# Patient Record
Sex: FEMALE | Race: WHITE | NOT HISPANIC OR LATINO | Employment: FULL TIME | URBAN - METROPOLITAN AREA
[De-identification: names, ages, dates, MRNs, and addresses within clinical notes are randomized per-mention and may not be internally consistent; named-entity substitution may affect disease eponyms.]

---

## 2021-05-19 ENCOUNTER — HISTORICAL (OUTPATIENT)
Dept: ADMINISTRATIVE | Facility: HOSPITAL | Age: 26
End: 2021-05-19

## 2021-05-19 LAB — SARS-COV-2 AG RESP QL IA.RAPID: NEGATIVE

## 2021-05-20 ENCOUNTER — HISTORICAL (OUTPATIENT)
Dept: SURGERY | Facility: HOSPITAL | Age: 26
End: 2021-05-20

## 2021-06-07 ENCOUNTER — HISTORICAL (OUTPATIENT)
Dept: ADMINISTRATIVE | Facility: HOSPITAL | Age: 26
End: 2021-06-07

## 2022-04-10 ENCOUNTER — HISTORICAL (OUTPATIENT)
Dept: ADMINISTRATIVE | Facility: HOSPITAL | Age: 27
End: 2022-04-10
Payer: MEDICAID

## 2022-04-29 VITALS
BODY MASS INDEX: 30.68 KG/M2 | SYSTOLIC BLOOD PRESSURE: 125 MMHG | DIASTOLIC BLOOD PRESSURE: 85 MMHG | HEIGHT: 61 IN | WEIGHT: 162.5 LBS

## 2022-04-30 NOTE — H&P
Patient:   Robyn Fuentes             MRN: [id number]            FIN: [id number]               Age:   25 years     Sex:  Female     :  1995   Associated Diagnoses:   None   Author:   Dequan Covington MD      Patient presents today for surgery. There are no interval changes from formal history and physiocal examination performed in clinic.    Patient will undergo surgery as detailed in clinic note.    Dr. Jarred Cannon

## 2022-05-04 NOTE — HISTORICAL [facility] CERNER
This is a historical note converted from [facility]. Formatting and pictures may have been removed.  Please reference [facility] for original formatting and attached multimedia. Chief Complaint  Referral for R 5th finger dislocation  History of Present Illness  Ms Fuentes is a 25-year-old left-hand-dominant female comes to clinic today for her right fifth MCP dislocation.? She reports that this happened more than 4 weeks ago.? She has had 2 failed attempts at closed reduction.? Unfortunately?she has been attempting to get herself admitted?to a drug rehabilitation facility?which resulted in a delay?in care for her hand.  ?  Patient reports he smokes proximally half pack of cigarettes per day. ?She is currently not working.  Review of Systems  Constitutional:?no fever, fatigue, weakness  Eye:?no vision loss, eye redness, drainage, or pain  ENMT:?no sore throat, ear pain, sinus pain/congestion, nasal congestion/drainage  Respiratory:?no cough, no wheezing, no shortness of breath  Cardiovascular:?no chest pain, no palpitations, no edema  Gastrointestinal:?no nausea, vomiting, or diarrhea. No abdominal pain  ?  ?  Physical Exam  Vitals & Measurements  HT:?149.00?cm? WT:?72.600?kg? BMI:?32.7?  Right hand:?The patient reports some subjective numbness [1/2]?on the right fifth finger?she is otherwise neurovascular intact distally in the right hand. ?She does report occasional numbness throughout the hand?and no particular peripheral nerve distribution?however this cannot be reproduced in clinic today.  ?  X-ray right hand?performed today reviewed with patient: Persistent dislocation of the right?fifth?MCP joint.  Assessment/Plan  1.?Dislocation of MCP joint, closed?S63.023A  ?I had a long discussion with the patient regarding her injury. ?She does seem to have a persistent dislocation in spite of attempts at closed reduction?of her right fifth MCP joint.? Recommended that she undergo attempt at closed reduction?in the operating room.  ?If we are not able to reduce this joint or does not stable?we will perform an open reduction and/or percutaneous pinning?of the right fifth MCP joint.  ?  The risks, benefits, outcomes, and alternatives of conservative vs operative management were discussed with the patient in clinic today.? Informed consent was obtained for?the aforementioned procedure.  Orders:  XR Hand Right Minimum 3 Views, Routine, 05/19/21 11:07:00 CDT, None, Ambulatory, Rad Type, Closed dislocation, Not Scheduled, 05/19/21 11:07:00 CDT   Problem List/Past Medical History  Ongoing  Morbid obesity  Historical  No qualifying data  Procedure/Surgical History  Application of finger splint; static (04/24/2021)  Closed treatment of metacarpophalangeal dislocation, single, with manipulation; without anesthesia (04/24/2021)  Immobilization of Right Finger using Splint (04/24/2021)  Reposition Right Metacarpophalangeal Joint, External Approach (04/24/2021)  D&C (2011)   Medications  acetaminophen-oxycodone 325 mg-7.5 mg oral tablet, 1 tab(s), Oral, QID,? ?Not taking  azithromycin 500 mg oral tablet, 1000 mg= 2 tab(s), Oral, Once,? ?Not taking  nitrofurantoin macrocrystals-monohydrate 100 mg oral capsule, 100 mg= 1 cap(s), Oral, BID,? ?Not taking  Allergies  No Known Medication Allergies  Social History  Abuse/Neglect  No, No, Yes, 04/24/2021  No, 04/21/2021  No, 08/25/2019  Alcohol  Current, 1-2 times per month, 05/19/2021  Substance Use  Past, 05/19/2021  Tobacco  4 or less cigarettes(less than 1/4 pack)/day in last 30 days, Cigarettes, No, 4 per day. 23 Years (Age started)., 05/19/2021  Health Maintenance  Health Maintenance  ???Pending?(in the next year)  ??? ??OverDue  ??? ? ? ?Cervical Cancer Screening due??12/02/18??and every 3??year(s)  ??? ? ? ?Depression Screening due??06/28/19??and every 1??year(s)  ??? ? ? ?Influenza Vaccine due??10/01/20??and every 1??day(s)  ??? ? ? ?Smoking Cessation due??01/01/21??Variable frequency  ??? ? ? ?Alcohol  Misuse Screening due??01/02/21??and every 1??year(s)  ??? ??Due?  ??? ? ? ?ADL Screening due??05/19/21??and every 1??year(s)  ??? ? ? ?Blood Pressure Screening due??05/19/21??Unknown Frequency  ??? ? ? ?Tetanus Vaccine due??05/19/21??and every 10??year(s)  ??? ??Due In Future?  ??? ? ? ?Obesity Screening not due until??01/01/22??and every 1??year(s)  ???Satisfied?(in the past 1 year)  ??? ??Satisfied?  ??? ? ? ?Blood Pressure Screening on??04/24/21.??Satisfied by Mathew Patel RN.  ??? ? ? ?Body Mass Index Check on??05/19/21.??Satisfied by Milligan RN, Desi R  ??? ? ? ?Diabetes Screening on??04/24/21.??Satisfied by Joanna Menjivar  ??? ? ? ?Obesity Screening on??05/19/21.??Satisfied by Milligan RN, Desi R  ?

## 2022-05-04 NOTE — HISTORICAL [facility] CERNER
This is a historical note converted from [facility]. Formatting and pictures may have been removed.  Please reference [place] for original formatting and attached multimedia. Chief Complaint  Post Op ORIF Rt hand 5th digit.  History of Present Illness  This is a 25-year-old female status post?ORPP of a?right fifth?MCP joint chronic dislocation?on 5/20/2021. ?She missed her first follow-up appointment. ?She is now 3 weeks out,?her MCP joint is still reduced, both pins are still in place.? She has not had any infectious symptoms at her pin sites.  Review of Systems  Constitutional:?no fever, fatigue, weakness  Eye:?no vision loss, eye redness, drainage, or pain  ENMT:?no sore throat, ear pain, sinus pain/congestion, nasal congestion/drainage  Respiratory:?no cough, no wheezing, no shortness of breath  Cardiovascular:?no chest pain, no palpitations, no edema  Gastrointestinal:?no nausea, vomiting, or diarrhea. No abdominal pain  ?  ?  Physical Exam  Vitals & Measurements  HR:?86(Peripheral)? BP:?125/85?  HT:?154.00?cm? WT:?73.700?kg? BMI:?31.08?  Right upper extremity  2 pins in the right small finger,?no erythema, drainage, fluctuance at pin sites  Sutures in place,?dorsal incision well healing, sutures removed today  Sensation tact light touch over radial and ulnar aspect of small finger  Finger warm and well-perfused brisk cap refill  ?  Independent review of radiographs shows?appropriate reduction of the fifth MCP joint.  Assessment/Plan  1.?Dislocation of MCP joint of hand?S63.269A  ?25-year-old female status post ORP P of right?fifth?MCP joint on?5/20/2021.  ?  She is doing well postoperatively,?we reiterated the importance of keeping the pins in,?maintaining nonweightbearing to that hand, working on range of motion exercises.? Staples?stitches removed today.? We will keep the pins in for a total of 6 weeks after surgery. ?We will see her back in 3 weeks with repeat x-rays, removal of pins,?and physical therapy to get  her range of motion back  ?  Dr. Jarred Cannon  ?  Robyn Fuentes?was evaluated at the time of the encounter with?Dr Cannon.? HPI, PE and treatment plan was reviewed.? Treatment plan was reasonable and necessary.??Imaging was reviewed at the time of visit.??  ?  Orders:  XR Hand Fifth Digit Right Min 2 Views, Routine, 06/07/21 11:32:00 CDT, Fracture, fracture, None, Ambulatory, Rad Type, Hand fracture, right, Not Scheduled, 06/07/21 11:32:00 CDT   Medications  Percocet 5/325 oral tablet, 1 tab(s), Oral, q4hr, PRN

## 2023-07-05 ENCOUNTER — HOSPITAL ENCOUNTER (EMERGENCY)
Facility: HOSPITAL | Age: 28
Discharge: HOME | End: 2023-07-05
Attending: STUDENT IN AN ORGANIZED HEALTH CARE EDUCATION/TRAINING PROGRAM
Payer: MEDICAID

## 2023-07-05 VITALS
TEMPERATURE: 98 F | OXYGEN SATURATION: 97 % | SYSTOLIC BLOOD PRESSURE: 109 MMHG | RESPIRATION RATE: 21 BRPM | DIASTOLIC BLOOD PRESSURE: 80 MMHG | HEART RATE: 86 BPM

## 2023-07-05 DIAGNOSIS — R55 SYNCOPE: ICD-10-CM

## 2023-07-05 DIAGNOSIS — R55 VASOVAGAL SYNCOPE: Primary | ICD-10-CM

## 2023-07-05 LAB
ALBUMIN SERPL-MCNC: 3.2 G/DL (ref 3.5–5)
ALBUMIN/GLOB SERPL: 1.2 RATIO (ref 1.1–2)
ALP SERPL-CCNC: 42 UNIT/L (ref 40–150)
ALT SERPL-CCNC: 11 UNIT/L (ref 0–55)
AMPHET UR QL SCN: NEGATIVE
APPEARANCE UR: CLEAR
AST SERPL-CCNC: 11 UNIT/L (ref 5–34)
B-HCG SERPL QL: NEGATIVE
BACTERIA #/AREA URNS AUTO: NORMAL /HPF
BARBITURATE SCN PRESENT UR: NEGATIVE
BASOPHILS # BLD AUTO: 0.01 X10(3)/MCL
BASOPHILS NFR BLD AUTO: 0.1 %
BENZODIAZ UR QL SCN: NEGATIVE
BILIRUB UR QL STRIP.AUTO: NEGATIVE MG/DL
BILIRUBIN DIRECT+TOT PNL SERPL-MCNC: 0.2 MG/DL
BUN SERPL-MCNC: 9.2 MG/DL (ref 7–18.7)
CALCIUM SERPL-MCNC: 8.9 MG/DL (ref 8.4–10.2)
CANNABINOIDS UR QL SCN: POSITIVE
CHLORIDE SERPL-SCNC: 103 MMOL/L (ref 98–107)
CK SERPL-CCNC: 49 U/L (ref 29–168)
CO2 SERPL-SCNC: 27 MMOL/L (ref 22–29)
COCAINE UR QL SCN: NEGATIVE
COLOR UR: YELLOW
CREAT SERPL-MCNC: 0.76 MG/DL (ref 0.55–1.02)
EOSINOPHIL # BLD AUTO: 0.13 X10(3)/MCL (ref 0–0.9)
EOSINOPHIL NFR BLD AUTO: 1.6 %
ERYTHROCYTE [DISTWIDTH] IN BLOOD BY AUTOMATED COUNT: 11.9 % (ref 11.5–17)
ETHANOL SERPL-MCNC: <10 MG/DL
FLUAV AG UPPER RESP QL IA.RAPID: NOT DETECTED
FLUBV AG UPPER RESP QL IA.RAPID: NOT DETECTED
GFR SERPLBLD CREATININE-BSD FMLA CKD-EPI: >60 MLS/MIN/1.73/M2
GLOBULIN SER-MCNC: 2.7 GM/DL (ref 2.4–3.5)
GLUCOSE SERPL-MCNC: 131 MG/DL (ref 74–100)
GLUCOSE UR QL STRIP.AUTO: NEGATIVE MG/DL
HCT VFR BLD AUTO: 43.3 % (ref 37–47)
HGB BLD-MCNC: 13.8 G/DL (ref 12–16)
IMM GRANULOCYTES # BLD AUTO: 0.03 X10(3)/MCL (ref 0–0.04)
IMM GRANULOCYTES NFR BLD AUTO: 0.4 %
KETONES UR QL STRIP.AUTO: NEGATIVE MG/DL
LEUKOCYTE ESTERASE UR QL STRIP.AUTO: ABNORMAL UNIT/L
LYMPHOCYTES # BLD AUTO: 2.7 X10(3)/MCL (ref 0.6–4.6)
LYMPHOCYTES NFR BLD AUTO: 33.2 %
MAGNESIUM SERPL-MCNC: 2.1 MG/DL (ref 1.6–2.6)
MCH RBC QN AUTO: 29.1 PG (ref 27–31)
MCHC RBC AUTO-ENTMCNC: 31.9 G/DL (ref 33–36)
MCV RBC AUTO: 91.4 FL (ref 80–94)
MONOCYTES # BLD AUTO: 0.49 X10(3)/MCL (ref 0.1–1.3)
MONOCYTES NFR BLD AUTO: 6 %
NEUTROPHILS # BLD AUTO: 4.77 X10(3)/MCL (ref 2.1–9.2)
NEUTROPHILS NFR BLD AUTO: 58.7 %
NITRITE UR QL STRIP.AUTO: NEGATIVE
OPIATES UR QL SCN: NEGATIVE
PCP UR QL: NEGATIVE
PH UR STRIP.AUTO: 6.5 [PH]
PH UR: 6.5 [PH] (ref 3–11)
PHOSPHATE SERPL-MCNC: 3.5 MG/DL (ref 2.3–4.7)
PLATELET # BLD AUTO: 274 X10(3)/MCL (ref 130–400)
PMV BLD AUTO: 9.4 FL (ref 7.4–10.4)
POTASSIUM SERPL-SCNC: 4.1 MMOL/L (ref 3.5–5.1)
PROT SERPL-MCNC: 5.9 GM/DL (ref 6.4–8.3)
PROT UR QL STRIP.AUTO: 30 MG/DL
RBC # BLD AUTO: 4.74 X10(6)/MCL (ref 4.2–5.4)
RBC #/AREA URNS AUTO: NORMAL /HPF
RBC UR QL AUTO: ABNORMAL UNIT/L
SARS-COV-2 RNA RESP QL NAA+PROBE: NOT DETECTED
SODIUM SERPL-SCNC: 135 MMOL/L (ref 136–145)
SP GR UR STRIP.AUTO: >=1.03
SQUAMOUS #/AREA URNS AUTO: NORMAL /HPF
UROBILINOGEN UR STRIP-ACNC: 0.2 MG/DL
WBC # SPEC AUTO: 8.13 X10(3)/MCL (ref 4.5–11.5)
WBC #/AREA URNS AUTO: NORMAL /HPF

## 2023-07-05 PROCEDURE — 84100 ASSAY OF PHOSPHORUS: CPT | Performed by: STUDENT IN AN ORGANIZED HEALTH CARE EDUCATION/TRAINING PROGRAM

## 2023-07-05 PROCEDURE — 81001 URINALYSIS AUTO W/SCOPE: CPT | Performed by: STUDENT IN AN ORGANIZED HEALTH CARE EDUCATION/TRAINING PROGRAM

## 2023-07-05 PROCEDURE — 99284 EMERGENCY DEPT VISIT MOD MDM: CPT

## 2023-07-05 PROCEDURE — 83735 ASSAY OF MAGNESIUM: CPT | Performed by: STUDENT IN AN ORGANIZED HEALTH CARE EDUCATION/TRAINING PROGRAM

## 2023-07-05 PROCEDURE — 80307 DRUG TEST PRSMV CHEM ANLYZR: CPT | Performed by: STUDENT IN AN ORGANIZED HEALTH CARE EDUCATION/TRAINING PROGRAM

## 2023-07-05 PROCEDURE — 85025 COMPLETE CBC W/AUTO DIFF WBC: CPT | Performed by: STUDENT IN AN ORGANIZED HEALTH CARE EDUCATION/TRAINING PROGRAM

## 2023-07-05 PROCEDURE — 82550 ASSAY OF CK (CPK): CPT | Performed by: STUDENT IN AN ORGANIZED HEALTH CARE EDUCATION/TRAINING PROGRAM

## 2023-07-05 PROCEDURE — 0240U COVID/FLU A&B PCR: CPT | Performed by: STUDENT IN AN ORGANIZED HEALTH CARE EDUCATION/TRAINING PROGRAM

## 2023-07-05 PROCEDURE — 82077 ASSAY SPEC XCP UR&BREATH IA: CPT | Performed by: STUDENT IN AN ORGANIZED HEALTH CARE EDUCATION/TRAINING PROGRAM

## 2023-07-05 PROCEDURE — 80053 COMPREHEN METABOLIC PANEL: CPT | Performed by: STUDENT IN AN ORGANIZED HEALTH CARE EDUCATION/TRAINING PROGRAM

## 2023-07-05 PROCEDURE — 81025 URINE PREGNANCY TEST: CPT | Performed by: STUDENT IN AN ORGANIZED HEALTH CARE EDUCATION/TRAINING PROGRAM

## 2023-07-05 PROCEDURE — 93005 ELECTROCARDIOGRAM TRACING: CPT

## 2023-07-05 RX ORDER — SODIUM CHLORIDE 9 MG/ML
1000 INJECTION, SOLUTION INTRAVENOUS
Status: DISCONTINUED | OUTPATIENT
Start: 2023-07-05 | End: 2023-07-05 | Stop reason: HOSPADM

## 2023-07-05 NOTE — ED PROVIDER NOTES
"Encounter Date: 7/5/2023       History     Chief Complaint   Patient presents with    Seizures     Possible first seizure onset after donating plasma, prior to arrival. Pt had witnessed episode of clinching mouth tightly, not responding to bf who witnessed it and pt vomited after. Pt states urinated on self and has no memory of event. Pt pale and sweaty in triage     Patient is a 27-year-old white female no significant past medical history presented to the ER today due to concerns about possible seizure.  Patient states she had just given plasma was driving her car when she started to feel lightheaded.  Patient states she pulled over and her significant other jumped into the  seat.  It was reported that patient had a unconscious episode for a period of time.  He states that the total time was approximately 1-2 minutes.  Patient did have some minor tremors during that time and was unconscious.  He denies any postictal state and states that upon awakening she stated "why are we pulled over. " patient was alert and oriented had that time.  Patient does report some urinary incontinence however she denies any tongue biting.  Patient denies a history of seizures.  This occurred just after giving/donating plasma.  She denies any fevers, chills, cough, congestion, chest pain, shortness of breath, palpitations, abdominal pain, diarrhea, constipation.  Important to note patient states that each time she gives blood or plasma she gets extremely weak and often has to sit down.    Review of patient's allergies indicates:  No Known Allergies  Past Medical History:   Diagnosis Date    Thyroid disease      Past Surgical History:   Procedure Laterality Date    DILATION AND CURETTAGE OF UTERUS       No family history on file.  Social History     Tobacco Use    Smoking status: Never   Substance Use Topics    Alcohol use: No    Drug use: No     Review of Systems   Constitutional:  Negative for chills, fatigue and fever.   HENT:  " Negative for congestion, sore throat and trouble swallowing.    Eyes:  Negative for pain and visual disturbance.   Respiratory:  Negative for cough, shortness of breath and wheezing.    Cardiovascular:  Negative for chest pain and palpitations.   Gastrointestinal:  Negative for abdominal pain, blood in stool, constipation, diarrhea, nausea and vomiting.   Genitourinary:  Negative for dysuria and hematuria.   Musculoskeletal:  Negative for back pain and myalgias.   Skin:  Negative for rash and wound.   Neurological:  Positive for syncope. Negative for dizziness, tremors, facial asymmetry, speech difficulty, weakness, light-headedness, numbness and headaches.   Psychiatric/Behavioral:  Negative for confusion. The patient is not nervous/anxious.      Physical Exam     Initial Vitals [07/05/23 1758]   BP Pulse Resp Temp SpO2   (!) 69/46 72 18 97.8 °F (36.6 °C) 97 %      MAP       --         Physical Exam    Nursing note and vitals reviewed.  Constitutional: She appears well-developed and well-nourished. She is not diaphoretic. No distress.   HENT:   Head: Normocephalic.   Right Ear: External ear normal.   Left Ear: External ear normal.   Nose: Nose normal.   Eyes: Conjunctivae and EOM are normal. Right eye exhibits no discharge. Left eye exhibits no discharge. No scleral icterus.   Neck:   Normal range of motion.  Cardiovascular:  Normal rate, regular rhythm and normal heart sounds.     Exam reveals no gallop and no friction rub.       No murmur heard.  Pulmonary/Chest: Breath sounds normal. No stridor. No respiratory distress. She has no wheezes. She has no rhonchi. She has no rales.   Abdominal: Abdomen is soft. She exhibits no distension. There is no abdominal tenderness. There is no rebound and no guarding.   Musculoskeletal:         General: Normal range of motion.      Cervical back: Normal range of motion.     Neurological: She is alert and oriented to person, place, and time. She has normal strength. No cranial  nerve deficit or sensory deficit. GCS score is 15. GCS eye subscore is 4. GCS verbal subscore is 5. GCS motor subscore is 6.   CN II-XII intact bilaterally, PERRLA, EOMI, AO, no facial asymmetry noted, no abnormalities of vision loss or peripheral vision loss, strength 5/5 x 4 extremities, sensation intact throughout, no focal neurological deficits noted on exam.  Gait stable without assistance. Negative Pronator drift bilaterally.       Skin: Skin is warm. No rash noted. No erythema.   Psychiatric: She has a normal mood and affect. Her behavior is normal.       ED Course   Procedures  Labs Reviewed   COMPREHENSIVE METABOLIC PANEL - Abnormal; Notable for the following components:       Result Value    Sodium Level 135 (*)     Glucose Level 131 (*)     Protein Total 5.9 (*)     Albumin Level 3.2 (*)     All other components within normal limits   URINALYSIS, REFLEX TO URINE CULTURE - Abnormal; Notable for the following components:    Protein, UA 30 (*)     Blood, UA Trace-Intact (*)     Leukocyte Esterase, UA Small (*)     All other components within normal limits   DRUG SCREEN, URINE (BEAKER) - Abnormal; Notable for the following components:    Cannabinoids, Urine Positive (*)     All other components within normal limits    Narrative:     Cut off concentrations:    Amphetamines - 1000 ng/ml  Barbiturates - 200 ng/ml  Benzodiazepine - 200 ng/ml  Cannabinoids (THC) - 50 ng/ml  Cocaine - 300 ng/ml  Fentanyl - 1.0 ng/ml  MDMA - 500 ng/ml  Opiates - 300 ng/ml   Phencyclidine (PCP) - 25 ng/ml    Specimen submitted for drug analysis and tested for pH and specific gravity in order to evaluate sample integrity. Suspect tampering if specific gravity is <1.003 and/or pH is not within the range of 4.5 - 8.0  False negatives may result form substances such as bleach added to urine.  False positives may result for the presence of a substance with similar chemical structure to the drug or its metabolite.    This test provides  only a PRELIMINARY analytical test result. A more specific alternate chemical method must be used in order to obtain a confirmed analytical result. Gas chromatography/mass spectrometry (GC/MS) is the preferred confirmatory method. Other chemical confirmation methods are available. Clinical consideration and professional judgement should be applied to any drug of abuse test result, particularly when preliminary positive results are used.    Positive results will be confirmed only at the physicians request. Unconfirmed screening results are to be used only for medical purposes (treatment).        CBC WITH DIFFERENTIAL - Abnormal; Notable for the following components:    MCHC 31.9 (*)     All other components within normal limits   COVID/FLU A&B PCR - Normal    Narrative:     The Xpert Xpress SARS-CoV-2/FLU/RSV plus is a rapid, multiplexed real-time PCR test intended for the simultaneous qualitative detection and differentiation of SARS-CoV-2, Influenza A, Influenza B, and respiratory syncytial virus (RSV) viral RNA in either nasopharyngeal swab or nasal swab specimens.         HCG QUALITATIVE URINE - Normal   ALCOHOL,MEDICAL (ETHANOL) - Normal   MAGNESIUM - Normal   PHOSPHORUS - Normal   CK - Normal   URINALYSIS, MICROSCOPIC - Normal   CBC W/ AUTO DIFFERENTIAL    Narrative:     The following orders were created for panel order CBC Auto Differential.  Procedure                               Abnormality         Status                     ---------                               -----------         ------                     CBC with Differential[[id number]]        Abnormal            Final result                 Please view results for these tests on the individual orders.     EKG Readings: (Independently Interpreted)   Initial Reading: No STEMI. Rhythm: Normal Sinus Rhythm. Heart Rate: 77. Ectopy: No Ectopy. Conduction: Normal. ST Segments: Normal ST Segments. T Waves: Normal. Axis: Normal.     Imaging Results    None    "       Medications   0.9%  NaCl infusion (1,000 mLs Intravenous Not Given 7/5/23 1900)     Medical Decision Making:   Initial Assessment:   Overall well-appearing 27-year-old female  Differential Diagnosis:   Seizure-like activity, syncope, vasovagal syncope, dehydration  Clinical Tests:   Lab Tests: Ordered and Reviewed  ED Management:  Vital signs stable patient is afebrile   Try to establish IV access but patient states she is deathly afraid of needles and refused IV fluids   Patient states she will drink water instead   Neuro exam is completely unremarkable   Based on the history provided by the witness in the patient patient did not have a postictal state or tongue biting   I suspect patient most likely had a vasovagal episode as he said near-syncope events after given blood each time   Patient has a fear of needles I think provoked this episode to occur   Basic labs were obtained showing no evidence of dehydration, electrolyte disturbance or elevation in her CK  Given the inner CK is normal this is reassuring as well as I would believe that a tonic-clonic activity seizure would provoke an elevated CK   I conveyed all these results to both the patient and her significant other and they state that they are fairly confident that she just "passed out. "I advised her not to drive after giving blood in the future   All questions were answered in layman's terms and did advise her to follow up with the PCP to see if further testing from a seizure standpoint is warranted                        Clinical Impression:   Final diagnoses:  [R55] Syncope  [R55] Vasovagal syncope (Primary)        ED Disposition Condition    Discharge Stable          ED Prescriptions    None       Follow-up Information       Follow up With Specialties Details Why Contact Info    [facility] - Emergency Dept Emergency Medicine  If symptoms worsen [address] [facility] [id number] [phone]             Kerwin" MD Yudith  07/05/23 2027

## 2023-07-05 NOTE — ED NOTES
Pt feels better after lying supine- refuses piv- attempted x1 oer a clark rn.  Bp has increased lying flat. Gcs 15 cm  sr no ectopy- bbs cta, [id number]. Skin warm/dry now. Resps even unalbored.

## 2023-07-06 NOTE — ED NOTES
Pt requests to not do ivf therapy for now- wants to drink fluids orally- ok w blood draw. Dr parrish aware

## 2023-09-26 ENCOUNTER — TELEPHONE (OUTPATIENT)
Dept: FAMILY MEDICINE | Facility: CLINIC | Age: 28
End: 2023-09-26
Payer: MEDICAID

## 2023-09-26 DIAGNOSIS — Z00.00 WELLNESS EXAMINATION: Primary | ICD-10-CM

## 2023-11-08 ENCOUNTER — HOSPITAL ENCOUNTER (EMERGENCY)
Facility: HOSPITAL | Age: 28
Discharge: HOME | End: 2023-11-08
Attending: SPECIALIST
Payer: MEDICAID

## 2023-11-08 VITALS
DIASTOLIC BLOOD PRESSURE: 72 MMHG | RESPIRATION RATE: 15 BRPM | WEIGHT: 180 LBS | HEART RATE: 65 BPM | SYSTOLIC BLOOD PRESSURE: 116 MMHG | HEIGHT: 60 IN | BODY MASS INDEX: 35.34 KG/M2 | OXYGEN SATURATION: 99 %

## 2023-11-08 DIAGNOSIS — R55 SYNCOPE: ICD-10-CM

## 2023-11-08 DIAGNOSIS — N39.0 ACUTE UTI: Primary | ICD-10-CM

## 2023-11-08 LAB
ALBUMIN SERPL-MCNC: 3.3 G/DL (ref 3.5–5)
ALBUMIN/GLOB SERPL: 1.1 RATIO (ref 1.1–2)
ALP SERPL-CCNC: 52 UNIT/L (ref 40–150)
ALT SERPL-CCNC: 11 UNIT/L (ref 0–55)
APPEARANCE UR: ABNORMAL
AST SERPL-CCNC: 18 UNIT/L (ref 5–34)
B-HCG SERPL QL: NEGATIVE
BACTERIA #/AREA URNS AUTO: ABNORMAL /HPF
BASOPHILS # BLD AUTO: 0.02 X10(3)/MCL
BASOPHILS NFR BLD AUTO: 0.2 %
BILIRUB SERPL-MCNC: 0.4 MG/DL
BILIRUB UR QL STRIP.AUTO: NEGATIVE
BUN SERPL-MCNC: 11.7 MG/DL (ref 7–18.7)
CALCIUM SERPL-MCNC: 8.5 MG/DL (ref 8.4–10.2)
CHLORIDE SERPL-SCNC: 107 MMOL/L (ref 98–107)
CO2 SERPL-SCNC: 22 MMOL/L (ref 22–29)
COLOR UR AUTO: YELLOW
CREAT SERPL-MCNC: 0.66 MG/DL (ref 0.55–1.02)
EOSINOPHIL # BLD AUTO: 0.11 X10(3)/MCL (ref 0–0.9)
EOSINOPHIL NFR BLD AUTO: 1.4 %
ERYTHROCYTE [DISTWIDTH] IN BLOOD BY AUTOMATED COUNT: 12.7 % (ref 11.5–17)
GFR SERPLBLD CREATININE-BSD FMLA CKD-EPI: >60 MLS/MIN/1.73/M2
GLOBULIN SER-MCNC: 3 GM/DL (ref 2.4–3.5)
GLUCOSE SERPL-MCNC: 118 MG/DL (ref 74–100)
GLUCOSE UR QL STRIP.AUTO: NEGATIVE
HCT VFR BLD AUTO: 43.4 % (ref 37–47)
HGB BLD-MCNC: 13.7 G/DL (ref 12–16)
IMM GRANULOCYTES # BLD AUTO: 0.02 X10(3)/MCL (ref 0–0.04)
IMM GRANULOCYTES NFR BLD AUTO: 0.2 %
KETONES UR QL STRIP.AUTO: ABNORMAL
LEUKOCYTE ESTERASE UR QL STRIP.AUTO: ABNORMAL
LYMPHOCYTES # BLD AUTO: 1.35 X10(3)/MCL (ref 0.6–4.6)
LYMPHOCYTES NFR BLD AUTO: 16.9 %
MCH RBC QN AUTO: 29 PG (ref 27–31)
MCHC RBC AUTO-ENTMCNC: 31.6 G/DL (ref 33–36)
MCV RBC AUTO: 91.9 FL (ref 80–94)
MONOCYTES # BLD AUTO: 0.56 X10(3)/MCL (ref 0.1–1.3)
MONOCYTES NFR BLD AUTO: 7 %
NEUTROPHILS # BLD AUTO: 5.95 X10(3)/MCL (ref 2.1–9.2)
NEUTROPHILS NFR BLD AUTO: 74.3 %
NITRITE UR QL STRIP.AUTO: NEGATIVE
PH UR STRIP.AUTO: 7.5 [PH]
PLATELET # BLD AUTO: 281 X10(3)/MCL (ref 130–400)
PMV BLD AUTO: 9.9 FL (ref 7.4–10.4)
POTASSIUM SERPL-SCNC: 4 MMOL/L (ref 3.5–5.1)
PROT SERPL-MCNC: 6.3 GM/DL (ref 6.4–8.3)
PROT UR QL STRIP.AUTO: ABNORMAL
RBC # BLD AUTO: 4.72 X10(6)/MCL (ref 4.2–5.4)
RBC #/AREA URNS AUTO: ABNORMAL /HPF
RBC UR QL AUTO: NEGATIVE
SODIUM SERPL-SCNC: 138 MMOL/L (ref 136–145)
SP GR UR STRIP.AUTO: 1.02 (ref 1–1.03)
SQUAMOUS #/AREA URNS AUTO: ABNORMAL /HPF
STREP A PCR (OHS): NOT DETECTED
UROBILINOGEN UR STRIP-ACNC: 1
WBC # SPEC AUTO: 8.01 X10(3)/MCL (ref 4.5–11.5)
WBC #/AREA URNS AUTO: ABNORMAL /HPF

## 2023-11-08 PROCEDURE — 80053 COMPREHEN METABOLIC PANEL: CPT | Performed by: SPECIALIST

## 2023-11-08 PROCEDURE — 93005 ELECTROCARDIOGRAM TRACING: CPT

## 2023-11-08 PROCEDURE — 96360 HYDRATION IV INFUSION INIT: CPT

## 2023-11-08 PROCEDURE — 87086 URINE CULTURE/COLONY COUNT: CPT | Performed by: SPECIALIST

## 2023-11-08 PROCEDURE — 87651 STREP A DNA AMP PROBE: CPT | Performed by: SPECIALIST

## 2023-11-08 PROCEDURE — 85025 COMPLETE CBC W/AUTO DIFF WBC: CPT | Performed by: SPECIALIST

## 2023-11-08 PROCEDURE — 81025 URINE PREGNANCY TEST: CPT | Performed by: SPECIALIST

## 2023-11-08 PROCEDURE — 99284 EMERGENCY DEPT VISIT MOD MDM: CPT | Mod: 25

## 2023-11-08 PROCEDURE — 25000003 PHARM REV CODE 250: Performed by: SPECIALIST

## 2023-11-08 PROCEDURE — 81001 URINALYSIS AUTO W/SCOPE: CPT | Performed by: SPECIALIST

## 2023-11-08 RX ORDER — NITROFURANTOIN 25; 75 MG/1; MG/1
100 CAPSULE ORAL
Status: COMPLETED | OUTPATIENT
Start: 2023-11-08 | End: 2023-11-08

## 2023-11-08 RX ORDER — NITROFURANTOIN 25; 75 MG/1; MG/1
100 CAPSULE ORAL 2 TIMES DAILY
Qty: 10 CAPSULE | Refills: 0 | Status: SHIPPED | OUTPATIENT
Start: 2023-11-08 | End: 2023-11-13

## 2023-11-08 RX ADMIN — SODIUM CHLORIDE 1000 ML: 9 INJECTION, SOLUTION INTRAVENOUS at 06:11

## 2023-11-08 RX ADMIN — NITROFURANTOIN MONOHYDRATE/MACROCRYSTALS 100 MG: 25; 75 CAPSULE ORAL at 07:11

## 2023-11-08 NOTE — Clinical Note
"Robyn Prajapatilorenzo Fuentes was seen and treated in our emergency department on 11/8/2023.  She may return to work on 11/09/2023.       If you have any questions or concerns, please don't hesitate to call.      Dr Corbin/YAIMA RN    "

## 2023-11-09 NOTE — ED PROVIDER NOTES
Encounter Date: 11/8/2023       History     Chief Complaint   Patient presents with    Loss of Consciousness     Gave plasma today stopped at 1pm/ she  felt weak and passed out in the car  after stopping and urinated on her self  in the car/ once before she had a seizure and did the same/cbg 124/     Patient reports giving plasma today and shortly after that around 1:00 p.m. while in her car had a brief syncopal episode; she felt it coming on and pulled over to the side of the road; she reports having urinary incontinence; this was about 6 hours prior to presentation; patient has had a previous episode when she gave plasma in the past and also had a seizure previously; she denied any seizure activity but there were no witnesses to the event; she states she was alert after this and did not have any injury; she also reports not eating since breakfast; currently she is feeling better, no dizziness, no chest pain, no shortness of breath, no fever, no abdominal pain; she does note a mild sore throat and would like to be checked for strep and she is had some urinary frequency lately    The history is provided by the patient.     Review of patient's allergies indicates:  No Known Allergies  Past Medical History:   Diagnosis Date    Thyroid disease      Past Surgical History:   Procedure Laterality Date    DILATION AND CURETTAGE OF UTERUS       No family history on file.  Social History     Tobacco Use    Smoking status: Never   Substance Use Topics    Alcohol use: No    Drug use: No     Review of Systems   Constitutional: Negative.    HENT: Negative.     Respiratory: Negative.     Cardiovascular: Negative.    Gastrointestinal: Negative.    Musculoskeletal: Negative.    Skin: Negative.    Neurological: Negative.    All other systems reviewed and are negative.      Physical Exam     Initial Vitals [11/08/23 1742]   BP Pulse Resp Temp SpO2   124/85 68 20 -- 99 %      MAP       --         Physical Exam    Nursing note and  vitals reviewed.  Constitutional: She appears well-developed and well-nourished.   HENT:   Head: Normocephalic and atraumatic.   Nose: Nose normal.   Mouth/Throat: Oropharynx is clear and moist.   Eyes: EOM are normal. Pupils are equal, round, and reactive to light.   Neck: Neck supple.   Normal range of motion.  Cardiovascular:  Normal rate, regular rhythm, normal heart sounds and intact distal pulses.           Pulmonary/Chest: Breath sounds normal. She has no wheezes.   Abdominal: Abdomen is soft. Bowel sounds are normal. There is no abdominal tenderness. There is no rebound and no guarding.   Musculoskeletal:         General: Normal range of motion.      Cervical back: Normal range of motion and neck supple.     Lymphadenopathy:     She has no cervical adenopathy.   Neurological: She is alert and oriented to person, place, and time. She has normal strength. No cranial nerve deficit or sensory deficit. GCS score is 15. GCS eye subscore is 4. GCS verbal subscore is 5. GCS motor subscore is 6.   Skin: Skin is warm and dry.         ED Course   Procedures  Labs Reviewed   COMPREHENSIVE METABOLIC PANEL - Abnormal; Notable for the following components:       Result Value    Glucose Level 118 (*)     Protein Total 6.3 (*)     Albumin Level 3.3 (*)     All other components within normal limits   URINALYSIS, REFLEX TO URINE CULTURE - Abnormal; Notable for the following components:    Appearance, UA Cloudy (*)     Protein, UA Trace (*)     Ketones, UA Trace (*)     Leukocyte Esterase, UA Trace (*)     All other components within normal limits   CBC WITH DIFFERENTIAL - Abnormal; Notable for the following components:    MCHC 31.6 (*)     All other components within normal limits   URINALYSIS, MICROSCOPIC - Abnormal; Notable for the following components:    Bacteria, UA Few (*)     WBC, UA 11-20 (*)     Squamous Epithelial Cells, UA Few (*)     All other components within normal limits   PREGNANCY TEST, URINE RAPID - Normal    STREP GROUP A BY PCR - Normal    Narrative:     The Xpert Xpress Strep A test is a rapid, qualitative in vitro diagnostic test for the detection of Streptococcus pyogenes (Group A ß-hemolytic Streptococcus, Strep A) in throat swab specimens from patients with signs and symptoms of pharyngitis.     CULTURE, URINE   CBC W/ AUTO DIFFERENTIAL    Narrative:     The following orders were created for panel order CBC auto differential.  Procedure                               Abnormality         Status                     ---------                               -----------         ------                     CBC with Differential[[id number]]        Abnormal            Final result                 Please view results for these tests on the individual orders.        ECG Results              EKG 12-lead (In process)  Result time 11/08/23 19:20:18      In process by Interface, Lab In [facility] (11/08/23 19:20:18)                   Narrative:    Test Reason : R55,    Vent. Rate : 075 BPM     Atrial Rate : 075 BPM     P-R Int : 154 ms          QRS Dur : 080 ms      QT Int : 408 ms       P-R-T Axes : 033 065 035 degrees     QTc Int : 455 ms    Normal sinus rhythm  Normal ECG  When compared with ECG of 05-JUL-2023 19:05,  No significant change was found    Referred By: [email or web address]   SELF           Confirmed By:                                   Imaging Results    None          Medications   sodium chloride 0.9% bolus 1,000 mL 1,000 mL ( Intravenous Stopped 11/8/23 1928)   nitrofurantoin (macrocrystal-monohydrate) 100 MG capsule 100 mg (100 mg Oral Given 11/8/23 1944)     Medical Decision Making  Patient reports giving plasma today and shortly after that around 1:00 p.m. while in her car had a brief syncopal episode; she felt it coming on and pulled over to the side of the road; she reports having urinary incontinence; this was about 6 hours prior to presentation; patient has had a previous episode when she gave plasma in the past and  also had a seizure previously; she denied any seizure activity but there were no witnesses to the event; she states she was alert after this and did not have any injury; she also reports not eating since breakfast; currently she is feeling better, no dizziness, no chest pain, no shortness of breath, no fever, no abdominal pain; she does note a mild sore throat and would like to be checked for strep and she is had some urinary frequency lately    DIFFERENTIAL DIAGNOSIS- syncope, electrolyte abnormality, anemia, dehydration, UTI, strep throat, sore throat nos    Amount and/or Complexity of Data Reviewed  Labs: ordered. Decision-making details documented in ED Course.     Details: ED Course as of 11/08/23 1944 Wed Nov 08, 2023 1932 Bacteria, UA(!): Few [DD]  1932 WBC, UA(!): 11-20 [DD]  1932 NITRITE UA: Negative [DD]  1932 Appearance, UA(!): Cloudy [DD]        ECG/medicine tests: ordered and independent interpretation performed. Decision-making details documented in ED Course.    Risk  Prescription drug management.  Risk Details: Patient was given a L of normal saline; her labs were unremarkable other than urinalysis was cloudy with leukocytes, negative for nitrites; microscopic showed bacteria and white blood cells; patient has had urinary frequency so will treat with Macrobid dose given in the ER and a prescription for twice a day x5 days sent to her pharmacy; recommend follow up with the primary care physician 1 week, encourage adequate hydration, avoid giving plasma               ED Course as of 11/08/23 1950 Wed Nov 08, 2023 1932 Bacteria, UA(!): Few [DD]   1932 WBC, UA(!): 11-20 [DD]   1932 NITRITE UA: Negative [DD]   1932 Appearance, UA(!): Cloudy [DD]      ED Course User Index  [DD] Patel Khalil MD        Patient Vitals for the past 24 hrs:   BP Pulse Resp SpO2 Height Weight   11/08/23 1900 116/72 65 15 99 % -- --   11/08/23 1843 94/67 65 15 99 % -- --   11/08/23 1825 111/75 85 (!) 21 98 % -- --    11/08/23 1742 124/85 68 20 99 % 5' (1.524 m) 81.6 kg (180 lb)   The patient is resting comfortably and in no acute distress.  She states that her symptoms have improved after treatment in Emergency Department. I personally discussed her test results and treatment plan.  Gave strict ED precautions.  Specific conditions for return to the emergency department and importance of follow up with her primary care provided or the physician listed on the discharge instructions.  Patient voices understanding and agrees to the plan discussed. All patients' questions have been answered at this time.   She has remained hemodynamically stable throughout entire stay in ED and is stable for discharge home.               Clinical Impression:   Final diagnoses:  [R55] Syncope  [N39.0] Acute UTI (Primary)        ED Disposition Condition    Discharge Stable          ED Prescriptions       Medication Sig Dispense Start Date End Date Auth. Provider    nitrofurantoin, macrocrystal-monohydrate, (MACROBID) 100 MG capsule Take 1 capsule (100 mg total) by mouth 2 (two) times daily. for 5 days 10 capsule 11/8/2023 11/13/2023 Patel Khalil MD          Follow-up Information       Follow up With Specialties Details Why Contact Info    Primary care MD  In 1 week               Patel Khalil MD  11/08/23 1950

## 2023-11-11 LAB — BACTERIA UR CULT: NORMAL

## 2023-11-16 ENCOUNTER — OFFICE VISIT (OUTPATIENT)
Dept: FAMILY MEDICINE | Facility: CLINIC | Age: 28
End: 2023-11-16
Payer: MEDICAID

## 2023-11-16 VITALS
DIASTOLIC BLOOD PRESSURE: 79 MMHG | BODY MASS INDEX: 38.53 KG/M2 | SYSTOLIC BLOOD PRESSURE: 122 MMHG | WEIGHT: 197.31 LBS | HEART RATE: 97 BPM | TEMPERATURE: 98 F | OXYGEN SATURATION: 97 %

## 2023-11-16 DIAGNOSIS — Z00.00 WELL ADULT EXAM: ICD-10-CM

## 2023-11-16 DIAGNOSIS — Z76.89 ENCOUNTER TO ESTABLISH CARE: Primary | ICD-10-CM

## 2023-11-16 DIAGNOSIS — Z12.4 ENCOUNTER FOR SCREENING FOR CERVICAL CANCER: ICD-10-CM

## 2023-11-16 PROCEDURE — 3074F SYST BP LT 130 MM HG: CPT | Mod: CPTII,,, | Performed by: NURSE PRACTITIONER

## 2023-11-16 PROCEDURE — 3078F DIAST BP <80 MM HG: CPT | Mod: CPTII,,, | Performed by: NURSE PRACTITIONER

## 2023-11-16 PROCEDURE — 3008F PR BODY MASS INDEX (BMI) DOCUMENTED: ICD-10-PCS | Mod: CPTII,,, | Performed by: NURSE PRACTITIONER

## 2023-11-16 PROCEDURE — 3074F PR MOST RECENT SYSTOLIC BLOOD PRESSURE < 130 MM HG: ICD-10-PCS | Mod: CPTII,,, | Performed by: NURSE PRACTITIONER

## 2023-11-16 PROCEDURE — 3008F BODY MASS INDEX DOCD: CPT | Mod: CPTII,,, | Performed by: NURSE PRACTITIONER

## 2023-11-16 PROCEDURE — 3078F PR MOST RECENT DIASTOLIC BLOOD PRESSURE < 80 MM HG: ICD-10-PCS | Mod: CPTII,,, | Performed by: NURSE PRACTITIONER

## 2023-11-16 PROCEDURE — 99203 PR OFFICE/OUTPT VISIT, NEW, LEVL III, 30-44 MIN: ICD-10-PCS | Mod: ,,, | Performed by: NURSE PRACTITIONER

## 2023-11-16 PROCEDURE — 99203 OFFICE O/P NEW LOW 30 MIN: CPT | Mod: ,,, | Performed by: NURSE PRACTITIONER

## 2023-11-16 PROCEDURE — 1159F MED LIST DOCD IN RCRD: CPT | Mod: CPTII,,, | Performed by: NURSE PRACTITIONER

## 2023-11-16 PROCEDURE — 1159F PR MEDICATION LIST DOCUMENTED IN MEDICAL RECORD: ICD-10-PCS | Mod: CPTII,,, | Performed by: NURSE PRACTITIONER

## 2023-11-16 RX ORDER — PROMETHAZINE HYDROCHLORIDE AND DEXTROMETHORPHAN HYDROBROMIDE 6.25; 15 MG/5ML; MG/5ML
5 SYRUP ORAL 4 TIMES DAILY PRN
COMMUNITY
Start: 2023-11-13 | End: 2023-11-20

## 2023-11-16 RX ORDER — PREDNISONE 20 MG/1
20 TABLET ORAL
COMMUNITY
Start: 2023-11-13 | End: 2023-11-18

## 2023-11-16 RX ORDER — LIDOCAINE HYDROCHLORIDE 20 MG/ML
SOLUTION ORAL; TOPICAL
COMMUNITY
Start: 2023-11-14

## 2023-11-16 NOTE — PROGRESS NOTES
Patient ID: [id number]     Chief Complaint: Establish Care (Used urgent care [facility] , no pcp ) and Seizures (Had 2 episodes in 4 months )      HPI:     Robyn Fuentes is a 28 y.o. female here today to establish care.  Patient reports previous ED visits due to syncopal episodes only after donating plasma.  Patient was advised to avoid donation in the future.      Past Medical History:  has a past medical history of Thyroid disease.    Surgical History:  has a past surgical history that includes Dilation and curettage of uterus.    Family History: family history includes Cancer in her mother; Heart disease in her father.    Social History:  reports that she has been smoking vaping with nicotine. She has never used smokeless tobacco. She reports that she does not drink alcohol and does not use drugs.    Current Outpatient Medications   Medication Instructions    LIDOCAINE VISCOUS 2 % solution        Patient has No Known Allergies.     Patient Care Team:  Julia Smith FNP as PCP - General (Family Medicine)       Subjective:     Review of Systems    12 point review of systems conducted, negative except as stated in the history of present illness. See HPI for details.      Objective:     Visit Vitals  /79   Pulse 97   Temp 98 °F (36.7 °C)   Wt 89.5 kg (197 lb 4.8 oz)   LMP 11/10/2023   SpO2 97%   BMI 38.53 kg/m²       Physical Exam    General: Alert and oriented, No acute distress.  Head: Normocephalic, Atraumatic.  Eye: Pupils are equal, round and reactive to light, Extraocular movements are intact, Sclera non-icteric.  Ears/Nose/Throat: Normal, Mucosa moist,Clear.  Neck/Thyroid: Supple, Non-tender, No carotid bruit, No lymphadenopathy, No JVD, Full range of motion.  Respiratory: Clear to auscultation bilaterally; No wheezes, rales or rhonchi,Non-labored respirations, Symmetrical chest wall expansion.  Cardiovascular: Regular rate and rhythm, S1/S2 normal, No murmurs, rubs or gallops.  Gastrointestinal: Soft,  "Non-tender, Non-distended, Normal bowel sounds, No palpable organomegaly.  Musculoskeletal: Normal range of motion.  Integumentary: Warm, Dry, Intact, No suspicious lesions or rashes.  Extremities: No clubbing, cyanosis or edema  Neurologic: No focal deficits, Cranial Nerves II-XII are grossly intact, Motor strength normal upper and lower extremities, Sensory exam intact.  Psychiatric: Normal interaction, Coherent speech, Euthymic mood, Appropriate affect     Labs Reviewed:     Chemistry:  Lab Results   Component Value Date     11/08/2023    K 4.0 11/08/2023    CHLORIDE 107 11/08/2023    BUN 11.7 11/08/2023    CREATININE 0.66 11/08/2023    EGFRNORACEVR >60 11/08/2023    GLUCOSE 118 (H) 11/08/2023    CALCIUM 8.5 11/08/2023    ALKPHOS 52 11/08/2023    LABPROT 6.3 (L) 11/08/2023    ALBUMIN 3.3 (L) 11/08/2023    BILIDIR 0.2 04/24/2021    IBILI 0.20 04/24/2021    AST 18 11/08/2023    ALT 11 11/08/2023    MG 2.10 07/05/2023    PHOS 3.5 07/05/2023    TSH 1.1762 04/24/2021        No results found for: "HGBA1C", "MICROALBCREA"     Hematology:  Lab Results   Component Value Date    WBC 8.01 11/08/2023    HGB 13.7 11/08/2023    HCT 43.4 11/08/2023     11/08/2023       Lipid Panel:  No results found for: "CHOL", "HDL", "LDL", "TRIG", "TOTALCHOLEST"     Urine:  Lab Results   Component Value Date    COLORUA Yellow 11/08/2023    APPEARANCEUA Cloudy (A) 11/08/2023    SGUA 1.020 11/08/2023    PHUA 7.5 11/08/2023    PROTEINUA Trace (A) 11/08/2023    GLUCOSEUA Negative 11/08/2023    KETONESUA Trace (A) 11/08/2023    BLOODUA Negative 11/08/2023    NITRITESUA Negative 11/08/2023    LEUKOCYTESUR Trace (A) 11/08/2023    RBCUA None Seen 11/08/2023    WBCUA 11-20 (A) 11/08/2023    BACTERIA Few (A) 11/08/2023          Assessment:       ICD-10-CM ICD-9-CM   1. Encounter to establish care  Z76.89 V65.8   2. Well adult exam  Z00.00 V70.0   3. Encounter for screening for cervical cancer  Z12.4 V76.2        Plan:   1. Encounter to " establish care    2. Well adult exam  Return to clinic in 6 weeks for wellness exam.  Labs to be completed prior to visit.  - Urinalysis, Reflex to Urine Culture    3. Encounter for screening for cervical cancer  - Ambulatory referral/consult to Gynecology; Future      Follow up in about 6 weeks (around 12/28/2023) for Wellness. In addition to their scheduled follow up, the patient has also been instructed to follow up on as needed basis.     Future Appointments   Date Time Provider Department Center   1/19/2024  9:45 AM Julia Smith FNP [facility]        [facility]